# Patient Record
Sex: MALE | Race: WHITE | NOT HISPANIC OR LATINO | Employment: FULL TIME | URBAN - METROPOLITAN AREA
[De-identification: names, ages, dates, MRNs, and addresses within clinical notes are randomized per-mention and may not be internally consistent; named-entity substitution may affect disease eponyms.]

---

## 2018-02-26 ENCOUNTER — THERAPY VISIT (OUTPATIENT)
Dept: PHYSICAL THERAPY | Facility: CLINIC | Age: 28
End: 2018-02-26
Payer: COMMERCIAL

## 2018-02-26 DIAGNOSIS — M79.672 LEFT FOOT PAIN: Primary | ICD-10-CM

## 2018-02-26 PROCEDURE — 97161 PT EVAL LOW COMPLEX 20 MIN: CPT | Mod: GP | Performed by: PHYSICAL THERAPIST

## 2018-02-26 PROCEDURE — 97110 THERAPEUTIC EXERCISES: CPT | Mod: GP | Performed by: PHYSICAL THERAPIST

## 2018-02-26 NOTE — MR AVS SNAPSHOT
After Visit Summary   2/26/2018    Juan Alberto Chery    MRN: [id number]           Patient Information     Date Of Birth          1990        Visit Information        Provider Department      2/26/2018 10:10 AM Meaghan Subramanian, PT [facility] [facility] Physical Therapy        Today's Diagnoses     Left foot pain    -  1       Follow-ups after your visit        Your next 10 appointments already scheduled     Mar 08, 2018 12:00 PM CST   SULAIMAN Extremity with Meaghan Subramanian, PT   [facility] [facility] Physical Therapy ([place] [place]  )    [address]  [facility] [address]   [phone]            Mar 14, 2018  9:30 AM CDT   SULAIMAN Extremity with Meaghan Subramanian PT   [facility] [facility] Physical Therapy ([place] [place]  )    [address]  [facility] [address]   [phone]            Mar 21, 2018 10:10 AM CDT   SULAIMAN Extremity with Meaghan Subramanian PT   [facility] [facility] Physical Therapy ([facility] [place]  )    [address]  [facility] [address]   [phone]            Mar 28, 2018 10:40 AM CDT   SULAIMAN Extremity with Scott Lira Pt, PT   [place] [facility] Physical [facility] ([place] [place]  )    [address]  [facility] [address]   [phone]            Apr 04, 2018 10:10 AM CDT   SULAIMAN Extremity with Meaghan Subramanian PT   [place] [facility] ([place] [place]  )    [address]  [facility] [address]   [phone]              Who to contact     If you have questions or need follow up information about today's clinic visit or your schedule please contact [place] [facility] directly at [phone].  Normal or non-critical lab and imaging results will be communicated to you by MyChart, letter or phone within 4 business days after the clinic has received the results.  "If you do not hear from us within 7 days, please contact the clinic through [employer] or phone. If you have a critical or abnormal lab result, we will notify you by phone as soon as possible.  Submit refill requests through [employer] or call your pharmacy and they will forward the refill request to us. Please allow 3 business days for your refill to be completed.          Additional Information About Your Visit        [employer] Information     [employer] lets you send messages to your doctor, view your test results, renew your prescriptions, schedule appointments and more. To sign up, go to [email or web address] . Click on \"Log in\" on the left side of the screen, which will take you to the Welcome page. Then click on \"Sign up Now\" on the right side of the page.     You will be asked to enter the access code listed below, as well as some personal information. Please follow the directions to create your username and password.     Your access code is: [email or web address]  Expires: 2018 11:57 AM     Your access code will  in 90 days. If you need help or a new code, please call your [place] clinic or [phone].        Care EveryWhere ID     This is your Care EveryWhere ID. This could be used by other organizations to access your [place] medical records  [id number]         Blood Pressure from Last 3 Encounters:   No data found for BP    Weight from Last 3 Encounters:   No data found for Wt              We Performed the Following     HC PT EVAL, LOW COMPLEXITY     [facility] INITIAL EVAL REPORT     THERAPEUTIC EXERCISES        Primary Care Provider Office Phone # Fax #    Hammad Diaz [phone]       [facility] [address] [place] [address]        Equal Access to Services     [facility] : Hadii gabriel Hicks, beni wang, qaybta mary toure. So [facility] [phone].    ATENCIÓN: Si habla español, tiene a levine disposición " servicios gratuitos de asistencia lingüística. [address]riquez [phone].    We comply with applicable federal civil rights laws and [place] laws. We do not discriminate on the basis of race, color, national origin, age, disability, sex, sexual orientation, or gender identity.            Thank you!     Thank you for choosing [place] [facility] PHYSICAL THERAPY  for your care. Our goal is always to provide you with excellent care. Hearing back from our patients is one way we can continue to improve our services. Please take a few minutes to complete the written survey that you may receive in the mail after your visit with us. Thank you!             Your Updated Medication List - Protect others around you: Learn how to safely use, store and throw away your medicines at www.disposemymeds.org.      Notice  As of 2/26/2018 11:57 AM    You have not been prescribed any medications.

## 2018-02-26 NOTE — PROGRESS NOTES
[place] [facility] Initial Evaluation  Subjective:  Patient is a 27 year old male presenting with rehab left ankle/foot hpi.                                    General health as reported by patient is excellent.  Pertinent medical history includes:  Other (numbness/tingling).    Other surgeries include:  Orthopedic surgery (knee).  Current medications:  Anti-inflammatory.  Current occupation is Personal care assistant  .    Primary job tasks include:  Prolonged standing, lifting, driving and other (carrying,pushing,and pulling).    Barriers include:  None as reported by patient.    Red flags:  None as reported by patient.                        Objective:  System    Physical Exam    General     ROS    Assessment/Plan:

## 2018-02-26 NOTE — PROGRESS NOTES
[place] [facility] Initial Evaluation  Subjective:  Patient is a 27 year old male presenting with rehab left ankle/foot hpi. The history is provided by the patient. No  was used.   Juan Alberto Chery is a 27 year old male with a left ankle condition.  Condition occurred with:  A wrong landing.  Condition occurred: during recreation/sport.  This is a new condition  Injured 1/14/2018 during basketball with coming down and having another player land on it.  Wore boot for two week and then had surgery on 2/8/2018.  Had screw placement.  Is NWB for 6 weeks on crutches in a boot.  Lives in an apartment with others.  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  Belongs to [employer].  .    Site of Pain: mid foot.    Pain is described as aching  and reported as 2/10.  Associated symptoms:  Edema. Pain is the same all the time.  Symptoms are exacerbated by activity   Since onset symptoms are gradually improving.  Special tests:  X-ray.                            Red flags:  None as reported by patient.                        Objective:  System    Ankle/Foot Evaluation  ROM:    AROM:    Dorsiflexion:  Left:   8  Right:   20  Plantarflexion:  Left:  30    Right:  38  Inversion:  Left:  Slight limitation, pain lateral ankle     Right:  WNL    Great toe flexion: Left:  Slight limitation     Right:   Great Toe Extension:  Left:  Slight limiation     Right: wnl  PROM:    Dorsiflexion: Left:    10     Right:     Plantarflexion: Left:    Wnl     Right:       Great Toe Flexion: Left:  Wnl     Right:     Great Toe Extension: Left:    Wnl     Right:        LIGAMENT TESTING: not assessed              SPECIAL TESTS: Special tests ankle: sensation over the dorsal left foot, 1st toe is decreased with light touch.    PALPATION: Palpation of ankle: incisions.  Left ankle tenderness present at:  incisional    EDEMA:   Left ankle edema present at: forefoot           MOBILITY TESTING: not assessed              FUNCTIONAL TESTS: not assessed                                                              General     ROS    Assessment/Plan:    Patient is a 27 year old male with foot complaints.    Patient has the following significant findings with corresponding treatment plan.                Diagnosis 1:  Foot pain  Pain -  manual therapy, self management, education and home program  Decreased ROM/flexibility - manual therapy, therapeutic exercise and home program  Decreased joint mobility - manual therapy, therapeutic exercise and home program  Decreased strength - therapeutic exercise, therapeutic activities and home program  Inflammation - cold therapy and self management/home program  Impaired gait - gait training, assistive devices and home program  Decreased function - therapeutic activities and home program    Therapy Evaluation Codes:   1) History comprised of:   Personal factors that impact the plan of care:      None.    Comorbidity factors that impact the plan of care are:      None.     Medications impacting care: None.  2) Examination of Body Systems comprised of:   Body structures and functions that impact the plan of care:      foot.   Activity limitations that impact the plan of care are:      Bathing, Cooking, Jumping, Lifting, Running, Sports, Squatting/kneeling, Stairs, Standing and Walking.  3) Clinical presentation characteristics are:   Stable/Uncomplicated.  4) Decision-Making    Low complexity using standardized patient assessment instrument and/or measureable assessment of functional outcome.  Cumulative Therapy Evaluation is: Low complexity.    Previous and current functional limitations:  (See Goal Flow Sheet for this information)    Short term and Long term goals: (See Goal Flow Sheet for this information)     Communication ability:  Patient appears to be able to clearly communicate and understand verbal and written communication and follow directions  correctly.  Treatment Explanation - The following has been discussed with the patient:   RX ordered/plan of care  Anticipated outcomes  Possible risks and side effects  This patient would benefit from PT intervention to resume normal activities.   Rehab potential is excellent.    Frequency:  1-2 X week, once daily  Duration:  for 6 weeks  Discharge Plan:  Independent in home treatment program.  Reach maximal therapeutic benefit.    Please refer to the daily flowsheet for treatment today, total treatment time and time spent performing 1:1 timed codes.

## 2018-02-26 NOTE — LETTER
[facility] PHYSICAL [facility]  [address]  [facility] [address] [phone]    2018    Re: Juan Alberto Chery   :   1990  MRN:  [id number]   REFERRING PHYSICIAN:   Hammad Diaz    [facility] ATHLETIC [facility] PHYSICAL [facility]    Date of Initial Evaluation:  2018  Visits:  Rxs Used: 1  Reason for Referral:  Left foot pain    EVALUATION SUMMARY    [facility] Initial Evaluation  Subjective:  Patient is a 27 year old male presenting with rehab left ankle/foot hpi. The history is provided by the patient. No  was used.   Juan Alberto Chery is a 27 year old male with a left ankle condition.  Condition occurred with:  A wrong landing.  Condition occurred: during recreation/sport.  This is a new condition  Injured 2018 during basketball with coming down and having another player land on it.  Wore boot for two week and then had surgery on 2018.  Had screw placement.  Is NWB for 6 weeks on crutches in a boot.  Lives in an apartment with others.  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  Belongs to [employer].  .    Site of Pain: mid foot.    Pain is described as aching  and reported as 2/10.  Associated symptoms:  Edema. Pain is the same all the time.  Symptoms are exacerbated by activity   Since onset symptoms are gradually improving.  Special tests:  X-ray.                General health as reported by patient is excellent.  Pertinent medical history includes:  Other (numbness/tingling).    Other surgeries include:  Orthopedic surgery (knee).  Current medications:  Anti-inflammatory.  Current occupation is Personal care assistant  Primary job tasks include:  Prolonged standing, lifting, driving and other (carrying,pushing,and pulling).  Barriers include:  None as reported by patient.  Red flags:  None as reported by  patient.  Objective:  System  Ankle/Foot Evaluation  ROM:    AROM:    Dorsiflexion:  Left:   8  Right:   20  Plantarflexion:  Left:  30    Right:  38  Inversion:  Left:  Slight limitation, pain lateral ankle     Right:  WNL  Great toe flexion: Left:  Slight limitation     Right:   Great Toe Extension:  Left:  Slight limiation     Right: wnl  PROM:    Dorsiflexion: Left:    10     Right:     Plantarflexion: Left:    Wnl     Right:       Great Toe Flexion: Left:  Wnl     Right:     Great Toe Extension: Left:    Wnl     Right:  LIGAMENT TESTING: not assessed  SPECIAL TESTS: Special tests ankle: sensation over the dorsal left foot, 1st toe is decreased with light touch.  PALPATION: Palpation of ankle: incisions.  Left ankle tenderness present at:  incisional  EDEMA:   Left ankle edema present at: forefoot  MOBILITY TESTING: not assessed  FUNCTIONAL TESTS: not assessed    Assessment/Plan:    Patient is a 27 year old male with foot complaints.    Patient has the following significant findings with corresponding treatment plan.                Diagnosis 1:  Foot pain  Pain -  manual therapy, self management, education and home program  Decreased ROM/flexibility - manual therapy, therapeutic exercise and home program  Decreased joint mobility - manual therapy, therapeutic exercise and home program  Decreased strength - therapeutic exercise, therapeutic activities and home program  Inflammation - cold therapy and self management/home program  Impaired gait - gait training, assistive devices and home program  Decreased function - therapeutic activities and home program  Therapy Evaluation Codes:   1) History comprised of:   Personal factors that impact the plan of care:      None.    Comorbidity factors that impact the plan of care are:      None.     Medications impacting care: None.  2) Examination of Body Systems comprised of:   Body structures and functions that impact the plan of care:      foot.   Activity limitations  that impact the plan of care are:      Bathing, Cooking, Jumping, Lifting, Running, Sports, Squatting/kneeling, Stairs, Standing and Walking.  3) Clinical presentation characteristics are:   Stable/Uncomplicated.  4) Decision-Making    Low complexity using standardized patient assessment instrument and/or measureable assessment of functional outcome.  Cumulative Therapy Evaluation is: Low complexity.    Previous and current functional limitations:  (See Goal Flow Sheet for this information)    Short term and Long term goals: (See Goal Flow Sheet for this information)   Communication ability:  Patient appears to be able to clearly communicate and understand verbal and written communication and follow directions correctly.  Treatment Explanation - The following has been discussed with the patient:   RX ordered/plan of care  Anticipated outcomes  Possible risks and side effects  This patient would benefit from PT intervention to resume normal activities.   Rehab potential is excellent.    Frequency:  1-2 X week, once daily  Duration:  for 6 weeks  Discharge Plan:  Independent in home treatment program.  Reach maximal therapeutic benefit.    Thank you for your referral.    INQUIRIES  Therapist: Meaghan Subramanian, PT, ATCR, [facility]  [facility] PH[facility]ICAL THERAP[facility]  [address] [place] [address] [phone]  [facility] [address]  Phone: [phone]  Fax: [phone]

## 2018-03-08 ENCOUNTER — THERAPY VISIT (OUTPATIENT)
Dept: PHYSICAL THERAPY | Facility: CLINIC | Age: 28
End: 2018-03-08
Payer: COMMERCIAL

## 2018-03-08 DIAGNOSIS — R60.0 LOCALIZED EDEMA: Primary | ICD-10-CM

## 2018-03-08 DIAGNOSIS — M79.672 LEFT FOOT PAIN: ICD-10-CM

## 2018-03-08 PROCEDURE — 97110 THERAPEUTIC EXERCISES: CPT | Mod: GP | Performed by: PHYSICAL THERAPIST

## 2018-03-08 PROCEDURE — 97016 VASOPNEUMATIC DEVICE THERAPY: CPT | Mod: GP | Performed by: PHYSICAL THERAPIST

## 2018-03-08 PROCEDURE — 97140 MANUAL THERAPY 1/> REGIONS: CPT | Mod: GP | Performed by: PHYSICAL THERAPIST

## 2018-03-14 ENCOUNTER — THERAPY VISIT (OUTPATIENT)
Dept: PHYSICAL THERAPY | Facility: CLINIC | Age: 28
End: 2018-03-14
Payer: COMMERCIAL

## 2018-03-14 DIAGNOSIS — R60.0 LOCALIZED EDEMA: ICD-10-CM

## 2018-03-14 DIAGNOSIS — M79.672 LEFT FOOT PAIN: ICD-10-CM

## 2018-03-14 PROCEDURE — 97140 MANUAL THERAPY 1/> REGIONS: CPT | Mod: GP | Performed by: PHYSICAL THERAPIST

## 2018-03-14 PROCEDURE — 97110 THERAPEUTIC EXERCISES: CPT | Mod: GP | Performed by: PHYSICAL THERAPIST

## 2018-03-21 ENCOUNTER — THERAPY VISIT (OUTPATIENT)
Dept: PHYSICAL THERAPY | Facility: CLINIC | Age: 28
End: 2018-03-21
Payer: COMMERCIAL

## 2018-03-21 DIAGNOSIS — M79.672 LEFT FOOT PAIN: ICD-10-CM

## 2018-03-21 DIAGNOSIS — R60.0 LOCALIZED EDEMA: ICD-10-CM

## 2018-03-21 PROCEDURE — 97110 THERAPEUTIC EXERCISES: CPT | Mod: GP | Performed by: PHYSICAL THERAPIST

## 2018-03-21 PROCEDURE — 97140 MANUAL THERAPY 1/> REGIONS: CPT | Mod: GP | Performed by: PHYSICAL THERAPIST

## 2018-03-21 NOTE — PROGRESS NOTES
Subjective:  HPI                    Objective:  System    Physical Exam    General   2/26/2018  ROS    Assessment/Plan:    PROGRESS  REPORT    Progress reporting period is from 2/26/2018 to 3/21/2018.       SUBJECTIVE  Subjective changes noted by patient:  .  Subjective: Feeling starting to come back on the dorsal foot.  Mild discomfort in the foot.      Current pain level is 0/10  .     Previous pain level was   Initial Pain level: 2/10.   Changes in function:  Yes (See Goal flowsheet attached for changes in current functional level)  Adverse reaction to treatment or activity: None    OBJECTIVE  Changes noted in objective findings:  Yes,   Objective: AROM: 15-38, normal inversion and eversion.  Minimal swelling and good scar mobility.       ASSESSMENT/PLAN  Updated problem list and treatment plan: Diagnosis 1:  Foot pain    Pain -  hot/cold therapy, manual therapy, self management, education and home program  Decreased ROM/flexibility - manual therapy, therapeutic exercise and home program  Decreased strength - therapeutic exercise, therapeutic activities and home program  Inflammation - self management/home program  Decreased function - therapeutic activities and home program  STG/LTGs have been met or progress has been made towards goals:  Yes (See Goal flow sheet completed today.)  Assessment of Progress: The patient's condition is improving.  The patient's condition has potential to improve.  Self Management Plans:  Patient has been instructed in a home treatment program.  I have re-evaluated this patient and find that the nature, scope, duration and intensity of the therapy is appropriate for the medical condition of the patient.  Juan Alberto continues to require the following intervention to meet STG and LTG's:  PT    Recommendations:  This patient would benefit from continued therapy.     Frequency:  2 X week, once daily  Duration:  for 3 weeks        Please refer to the daily flowsheet for treatment today,  total treatment time and time spent performing 1:1 timed codes.

## 2018-03-21 NOTE — MR AVS SNAPSHOT
"              After Visit Summary   3/21/2018    Juan Alberto Chery    MRN: [id number]           Patient Information     Date Of Birth          1990        Visit Information        Provider Department      3/21/2018 10:10 AM Meaghan Subramanian, PT [facility] Physical Therapy        Today's Diagnoses     Localized edema        Left foot pain           Follow-ups after your visit        Your next 10 appointments already scheduled     Mar 28, 2018 10:40 AM CDT   SULAIMAN Extremity with Scott Lira Pt, PT   [facility] Physical Therapy ([facility]  )    [address]  [facility] [address]   [phone]            Apr 04, 2018 10:10 AM CDT   SULAIMAN Extremity with Meaghan Subramanian, PT   [facility] Physical Therapy ([facility]  )    [address]  [facility] [id number] [phone]              Who to contact     If you have questions or need follow up information about today's clinic visit or your schedule please contact [facility]IC [facility] PHYSICAL THERAPY directly at [phone].  Normal or non-critical lab and imaging results will be communicated to you by MyChart, letter or phone within 4 business days after the clinic has received the results. If you do not hear from us within 7 days, please contact the clinic through [employer] or phone. If you have a critical or abnormal lab result, we will notify you by phone as soon as possible.  Submit refill requests through [employer] or call your pharmacy and they will forward the refill request to us. Please allow 3 business days for your refill to be completed.          Additional Information About Your Visit        [employer] Information     [employer] lets you send messages to your doctor, view your test results, renew your prescriptions, schedule appointments and more. To sign up, go to [email or web address] . Click on \"Log in\" on the left " "side of the screen, which will take you to the Welcome page. Then click on \"Sign up Now\" on the right side of the page.     You will be asked to enter the access code listed below, as well as some personal information. Please follow the directions to create your username and password.     Your access code is: [email or web address]  Expires: 2018 12:57 PM     Your access code will  in 90 days. If you need help or a new code, please call your [place] clinic or [phone].        Care EveryWhere ID     This is your Care EveryWhere ID. This could be used by other organizations to access your [place] medical records  [id number]         Blood Pressure from Last 3 Encounters:   No data found for BP    Weight from Last 3 Encounters:   No data found for Wt              We Performed the Following     [facility] PROGRESS NOTES REPORT     MANUAL THER TECH,1+REGIONS,EA 15 MIN     THERAPEUTIC EXERCISES        Primary Care Provider Office Phone # Fax #    Hammad Diaz [phone]       [facility] [address] [place] [address]        Equal Access to Services     [facility]: Hadii aad ku hadasho Sodena, waaxda luqadaha, qaybta kaalmada adexiao, mary parker . So [facility] [phone].    ATENCIÓN: Si habla español, tiene a levine disposición servicios gratuitos de asistencia lingüística. [facility] [phone].    We comply with applicable federal civil rights laws and [place] laws. We do not discriminate on the basis of race, color, national origin, age, disability, sex, sexual orientation, or gender identity.            Thank you!     Thank you for choosing [facility] PHYSICAL THERAPY  for your care. Our goal is always to provide you with excellent care. Hearing back from our patients is one way we can continue to improve our services. Please take a few minutes to complete the written survey that you may receive in the mail after " your visit with us. Thank you!             Your Updated Medication List - Protect others around you: Learn how to safely use, store and throw away your medicines at www.disposemymeds.org.      Notice  As of 3/21/2018 10:56 AM    You have not been prescribed any medications.

## 2018-03-28 ENCOUNTER — THERAPY VISIT (OUTPATIENT)
Dept: PHYSICAL THERAPY | Facility: CLINIC | Age: 28
End: 2018-03-28
Payer: COMMERCIAL

## 2018-03-28 DIAGNOSIS — R60.0 LOCALIZED EDEMA: ICD-10-CM

## 2018-03-28 DIAGNOSIS — M79.672 LEFT FOOT PAIN: ICD-10-CM

## 2018-03-28 PROCEDURE — 97110 THERAPEUTIC EXERCISES: CPT | Mod: GP | Performed by: PHYSICAL THERAPIST

## 2018-03-28 PROCEDURE — 97140 MANUAL THERAPY 1/> REGIONS: CPT | Mod: GP | Performed by: PHYSICAL THERAPIST

## 2018-04-04 ENCOUNTER — THERAPY VISIT (OUTPATIENT)
Dept: PHYSICAL THERAPY | Facility: CLINIC | Age: 28
End: 2018-04-04
Payer: COMMERCIAL

## 2018-04-04 DIAGNOSIS — M79.672 LEFT FOOT PAIN: ICD-10-CM

## 2018-04-04 DIAGNOSIS — R60.0 LOCALIZED EDEMA: ICD-10-CM

## 2018-04-04 PROCEDURE — 97110 THERAPEUTIC EXERCISES: CPT | Mod: GP | Performed by: PHYSICAL THERAPIST

## 2018-04-04 PROCEDURE — 97140 MANUAL THERAPY 1/> REGIONS: CPT | Mod: GP | Performed by: PHYSICAL THERAPIST

## 2018-04-19 ENCOUNTER — THERAPY VISIT (OUTPATIENT)
Dept: PHYSICAL THERAPY | Facility: CLINIC | Age: 28
End: 2018-04-19
Payer: COMMERCIAL

## 2018-04-19 DIAGNOSIS — R60.0 LOCALIZED EDEMA: ICD-10-CM

## 2018-04-19 DIAGNOSIS — M79.672 LEFT FOOT PAIN: ICD-10-CM

## 2018-04-19 PROCEDURE — 97110 THERAPEUTIC EXERCISES: CPT | Mod: GP | Performed by: PHYSICAL THERAPIST

## 2018-04-19 PROCEDURE — 97140 MANUAL THERAPY 1/> REGIONS: CPT | Mod: GP | Performed by: PHYSICAL THERAPIST

## 2018-04-26 ENCOUNTER — THERAPY VISIT (OUTPATIENT)
Dept: PHYSICAL THERAPY | Facility: CLINIC | Age: 28
End: 2018-04-26
Payer: COMMERCIAL

## 2018-04-26 DIAGNOSIS — R60.0 LOCALIZED EDEMA: ICD-10-CM

## 2018-04-26 DIAGNOSIS — M79.672 LEFT FOOT PAIN: ICD-10-CM

## 2018-04-26 PROCEDURE — 97110 THERAPEUTIC EXERCISES: CPT | Mod: GP | Performed by: PHYSICAL THERAPIST

## 2018-04-26 PROCEDURE — 97112 NEUROMUSCULAR REEDUCATION: CPT | Mod: GP | Performed by: PHYSICAL THERAPIST

## 2018-05-03 ENCOUNTER — THERAPY VISIT (OUTPATIENT)
Dept: PHYSICAL THERAPY | Facility: CLINIC | Age: 28
End: 2018-05-03
Payer: COMMERCIAL

## 2018-05-03 DIAGNOSIS — M79.672 LEFT FOOT PAIN: ICD-10-CM

## 2018-05-03 DIAGNOSIS — R60.0 LOCALIZED EDEMA: ICD-10-CM

## 2018-05-03 PROCEDURE — 97112 NEUROMUSCULAR REEDUCATION: CPT | Mod: GP | Performed by: PHYSICAL THERAPIST

## 2018-05-03 PROCEDURE — 97110 THERAPEUTIC EXERCISES: CPT | Mod: GP | Performed by: PHYSICAL THERAPIST

## 2018-05-24 ENCOUNTER — THERAPY VISIT (OUTPATIENT)
Dept: PHYSICAL THERAPY | Facility: CLINIC | Age: 28
End: 2018-05-24
Payer: COMMERCIAL

## 2018-05-24 DIAGNOSIS — M79.672 LEFT FOOT PAIN: ICD-10-CM

## 2018-05-24 DIAGNOSIS — R60.0 LOCALIZED EDEMA: ICD-10-CM

## 2018-05-24 PROCEDURE — 97110 THERAPEUTIC EXERCISES: CPT | Mod: GP | Performed by: PHYSICAL THERAPIST

## 2018-05-24 NOTE — MR AVS SNAPSHOT
"              After Visit Summary   2018    Juan Alberto Chery    MRN: [id number]           Patient Information     Date Of Birth          1990        Visit Information        Provider Department      2018 3:10 PM Meaghan Subramanian PT [facility] Physical Therapy        Today's Diagnoses     Localized edema        Left foot pain           Follow-ups after your visit        Who to contact     If you have questions or need follow up information about today's clinic visit or your schedule please contact [facility] ATHLETIC [facility] PHYSICAL [facility] directly at [phone].  Normal or non-critical lab and imaging results will be communicated to you by [employer], letter or phone within 4 business days after the clinic has received the results. If you do not hear from us within 7 days, please contact the clinic through [employer] or phone. If you have a critical or abnormal lab result, we will notify you by phone as soon as possible.  Submit refill requests through [employer] or call your pharmacy and they will forward the refill request to us. Please allow 3 business days for your refill to be completed.          Additional Information About Your Visit        MyChart Information     [employer] lets you send messages to your doctor, view your test results, renew your prescriptions, schedule appointments and more. To sign up, go to [email or web address] . Click on \"Log in\" on the left side of the screen, which will take you to the Welcome page. Then click on \"Sign up Now\" on the right side of the page.     You will be asked to enter the access code listed below, as well as some personal information. Please follow the directions to create your username and password.     Your access code is: [email or web address]  Expires: 2018  3:06 PM     Your access code will  in 90 days. If you need help or a new code, please call your [place] clinic or [phone].        Care " EveryWhere ID     This is your Care EveryWhere ID. This could be used by other organizations to access your [place] medical records  [id number]         Blood Pressure from Last 3 Encounters:   No data found for BP    Weight from Last 3 Encounters:   No data found for Wt              We Performed the Following     SULAIMAN PROGRESS NOTES REPORT     THERAPEUTIC EXERCISES        Primary Care Provider Office Phone # Fax #    Hammad Diaz [phone]       [facility] [address] [place] [address]        Equal Access to Services     [facility] : Hadii aad ku hadasho Soomaali, waaxda luqadaha, qaybta kaalmada adeegyada, waxay idiin hayaan adeeg kharash lamarivel . So [facility] [phone].    ATENCIÓN: Si habla español, tiene a levine disposición servicios gratuitos de asistencia lingüística. [facility] [phone].    We comply with applicable federal civil rights laws and [place] laws. We do not discriminate on the basis of race, color, national origin, age, disability, sex, sexual orientation, or gender identity.            Thank you!     Thank you for choosing [facility]  for your care. Our goal is always to provide you with excellent care. Hearing back from our patients is one way we can continue to improve our services. Please take a few minutes to complete the written survey that you may receive in the mail after your visit with us. Thank you!             Your Updated Medication List - Protect others around you: Learn how to safely use, store and throw away your medicines at www.disposemymeds.org.      Notice  As of 5/24/2018  3:40 PM    You have not been prescribed any medications.

## 2018-05-24 NOTE — PROGRESS NOTES
Subjective:  HPI                    Objective:  System    Physical Exam    General     ROS    Assessment/Plan:    DISCHARGE REPORT    Progress reporting period is from 3/21/18 to 5/24/18.       SUBJECTIVE  Subjective changes noted by patient: .  Subjective: Trip went very well, went hiking [place] and foot didn't act up, just sore on the plantar surface and anterior capsule. Was able to complete everything he wanted to.     Current pain level is 0/10  .     Previous pain level was  2/10 Initial Pain level: 2/10.   Changes in function:  Yes (See Goal flowsheet attached for changes in current functional level)  Adverse reaction to treatment or activity: None    OBJECTIVE  Changes noted in objective findings:  Yes,   Objective: Pain is gone except for slight anterior tib tendon pain and irritation. Slight edema noted. Educated on calf raises concentric and return to run program. Assessed balance and pt has progressed to PLF.       ASSESSMENT/PLAN  STG/LTGs have been met or progress has been made towards goals:  Yes (See Goal flow sheet completed today.)  Assessment of Progress: The patient has met all of their long term goals.  Self Management Plans:  Patient is independent in a home treatment program.    Recommendations:  This patient is ready to be discharged from therapy and continue their home treatment program.    Please refer to the daily flowsheet for treatment today, total treatment time and time spent performing 1:1 timed codes.

## 2020-08-17 DIAGNOSIS — I86.1 VARICOCELE: Primary | ICD-10-CM

## 2020-08-26 DIAGNOSIS — I86.1 VARICOCELE: ICD-10-CM

## 2020-08-26 LAB
ABNORMAL SPERM: 95 MORPHOLOGY
ABSTINENCE DAYS: 3 DAYS (ref 2–7)
AGGLUTINATION: NO YES/NO
ANALYSIS TEMP - CENTIGRADE: 25 CENTIGRADE
CELL FRAGMENTS: NORMAL %
COLLECTION METHOD: NORMAL
COLLECTION SITE: NORMAL
CONSENT TO RELEASE TO PARTNER: NO
HEAD DEFECT: 95
IMMATURE SPERM: NORMAL %
IMMOTILE: 25 %
LAB RECEIPT TIME: NORMAL
LIQUEFIED: YES YES/NO
MIDPIECE DEFECT: 49
NON-PROGRESSIVE MOTILITY: 2 %
NORMAL SPERM: 5 % NORMAL FORMS (ref 4–?)
PROGRESSIVE MOTILITY: 73 % (ref 32–?)
ROUND CELLS: <0.1 MILLION/ML (ref ?–2)
SPECIMEN CONCENTRATION: 45 MILLION/ML (ref 15–?)
SPECIMEN PH: 7.6 PH (ref 7.2–?)
SPECIMEN TYPE: NORMAL
SPECIMEN VOL UR: 3.8 ML (ref 1.5–?)
TAIL DEFECT: 12
TIME OF ANALYSIS: NORMAL
TOTAL NUMBER: 171 MILLION (ref 39–?)
TOTAL PROGRESSIVE MOTILE: 125 MILLION (ref 15.6–?)
VISCOUS: NO YES/NO
VITALITY: NORMAL % (ref 58–?)
WBC SPECIMEN: NORMAL %

## 2020-08-26 PROCEDURE — 89322 SEMEN ANAL STRICT CRITERIA: CPT

## 2021-10-28 ENCOUNTER — LAB REQUISITION (OUTPATIENT)
Dept: LAB | Facility: CLINIC | Age: 31
End: 2021-10-28

## 2021-10-28 PROCEDURE — 86481 TB AG RESPONSE T-CELL SUSP: CPT | Performed by: INTERNAL MEDICINE

## 2021-10-28 PROCEDURE — 86706 HEP B SURFACE ANTIBODY: CPT | Performed by: INTERNAL MEDICINE

## 2021-10-29 LAB — HBV SURFACE AB SERPL IA-ACNC: 13.87 M[IU]/ML

## 2021-10-30 LAB
QUANTIFERON MITOGEN: 10 IU/ML
QUANTIFERON NIL TUBE: 0.03 IU/ML
QUANTIFERON TB1 TUBE: 0.03 IU/ML
QUANTIFERON TB2 TUBE: 0.03

## 2021-10-31 LAB
GAMMA INTERFERON BACKGROUND BLD IA-ACNC: 0.03 IU/ML
M TB IFN-G BLD-IMP: NEGATIVE
M TB IFN-G CD4+ BCKGRND COR BLD-ACNC: 9.97 IU/ML
MITOGEN IGNF BCKGRD COR BLD-ACNC: 0 IU/ML
MITOGEN IGNF BCKGRD COR BLD-ACNC: 0 IU/ML

## 2022-09-13 ENCOUNTER — TELEPHONE (OUTPATIENT)
Dept: ORTHOPEDICS | Facility: CLINIC | Age: 32
End: 2022-09-13

## 2022-09-13 ENCOUNTER — TELEPHONE (OUTPATIENT)
Dept: FAMILY MEDICINE | Facility: CLINIC | Age: 32
End: 2022-09-13

## 2022-09-13 ENCOUNTER — OFFICE VISIT (OUTPATIENT)
Dept: FAMILY MEDICINE | Facility: CLINIC | Age: 32
End: 2022-09-13
Payer: COMMERCIAL

## 2022-09-13 VITALS
OXYGEN SATURATION: 96 % | TEMPERATURE: 97.9 F | WEIGHT: 189.5 LBS | RESPIRATION RATE: 16 BRPM | DIASTOLIC BLOOD PRESSURE: 78 MMHG | HEART RATE: 57 BPM | SYSTOLIC BLOOD PRESSURE: 125 MMHG

## 2022-09-13 DIAGNOSIS — R07.9 CHEST PAIN, UNSPECIFIED TYPE: Primary | ICD-10-CM

## 2022-09-13 LAB
ATRIAL RATE - MUSE: 61 BPM
BASOPHILS # BLD AUTO: 0.1 10E3/UL (ref 0–0.2)
BASOPHILS NFR BLD AUTO: 1 %
D DIMER PPP FEU-MCNC: <=0.27 UG/ML FEU (ref 0–0.5)
DIASTOLIC BLOOD PRESSURE - MUSE: NORMAL MMHG
EOSINOPHIL # BLD AUTO: 0 10E3/UL (ref 0–0.7)
EOSINOPHIL NFR BLD AUTO: 1 %
ERYTHROCYTE [DISTWIDTH] IN BLOOD BY AUTOMATED COUNT: 11.7 % (ref 10–15)
HCT VFR BLD AUTO: 45.5 % (ref 40–53)
HGB BLD-MCNC: 15.6 G/DL (ref 13.3–17.7)
IMM GRANULOCYTES # BLD: 0 10E3/UL
IMM GRANULOCYTES NFR BLD: 0 %
INTERPRETATION ECG - MUSE: NORMAL
LYMPHOCYTES # BLD AUTO: 1.4 10E3/UL (ref 0.8–5.3)
LYMPHOCYTES NFR BLD AUTO: 25 %
MCH RBC QN AUTO: 31.9 PG (ref 26.5–33)
MCHC RBC AUTO-ENTMCNC: 34.3 G/DL (ref 31.5–36.5)
MCV RBC AUTO: 93 FL (ref 78–100)
MONOCYTES # BLD AUTO: 0.5 10E3/UL (ref 0–1.3)
MONOCYTES NFR BLD AUTO: 9 %
NEUTROPHILS # BLD AUTO: 3.6 10E3/UL (ref 1.6–8.3)
NEUTROPHILS NFR BLD AUTO: 65 %
P AXIS - MUSE: 81 DEGREES
PLATELET # BLD AUTO: 185 10E3/UL (ref 150–450)
PR INTERVAL - MUSE: 176 MS
QRS DURATION - MUSE: 100 MS
QT - MUSE: 446 MS
QTC - MUSE: 448 MS
R AXIS - MUSE: 88 DEGREES
RBC # BLD AUTO: 4.89 10E6/UL (ref 4.4–5.9)
SYSTOLIC BLOOD PRESSURE - MUSE: NORMAL MMHG
T AXIS - MUSE: 36 DEGREES
VENTRICULAR RATE- MUSE: 61 BPM
WBC # BLD AUTO: 5.5 10E3/UL (ref 4–11)

## 2022-09-13 PROCEDURE — 85025 COMPLETE CBC W/AUTO DIFF WBC: CPT | Performed by: FAMILY MEDICINE

## 2022-09-13 PROCEDURE — 36415 COLL VENOUS BLD VENIPUNCTURE: CPT | Performed by: FAMILY MEDICINE

## 2022-09-13 PROCEDURE — 93010 ELECTROCARDIOGRAM REPORT: CPT | Performed by: INTERNAL MEDICINE

## 2022-09-13 PROCEDURE — 99204 OFFICE O/P NEW MOD 45 MIN: CPT | Performed by: FAMILY MEDICINE

## 2022-09-13 PROCEDURE — 85379 FIBRIN DEGRADATION QUANT: CPT | Performed by: FAMILY MEDICINE

## 2022-09-13 PROCEDURE — 93005 ELECTROCARDIOGRAM TRACING: CPT | Performed by: FAMILY MEDICINE

## 2022-09-13 NOTE — PROGRESS NOTES
Assessment:       Chest pain, unspecified type  - EKG 12-lead, tracing only  - XR Chest 2 Views  - D dimer quantitative  - CBC with platelets differential  - D dimer quantitative  - CBC with platelets differential         Plan:     Patient with chest pain, likely costochondritis.  His CBC unremarkable.  D-dimer ordered and is unremarkable.  EKG ordered and reviewed by myself showing a normal sinus rhythm without acute ST or T wave changes chest x-ray ordered and reviewed by myself also shows no acute changes, pneumothorax, infiltrate, or other significant abnormality.  Recommend NSAIDs taken regularly with food over the next week or so.  Recommend follow-up in ED if developing worsening chest pain, shortness of breath, pain with exertion, or any other concerning symptoms.  Patient and his wife are agreeable with this plan.        Subjective:       31 year old male presents for evaluation of sudden onset of acute anterior chest wall pain that started about 30 minutes ago and is worse with movement.  3 days ago he got a COVID-19 vaccination (his fourth) and never had an issue with this prior.  He did a workout in the gym yesterday which went fine.  The pain is better if he sits still.  Does not seem to be bothered by exertion but does with any type of turning of his chest wall as well as pushing on his chest wall.  No trauma to his chest.  He has not had any fevers, nasal congestion, cough, and does not have any shortness of breath.    Patient Active Problem List   Diagnosis   (none) - all problems resolved or deleted       No past medical history on file.    No past surgical history on file.    No current outpatient medications on file.     No current facility-administered medications for this visit.       No Known Allergies    No family history on file.    Social History     Socioeconomic History     Marital status: Single     Spouse name: None     Number of children: None     Years of education: None     Highest  education level: None   Tobacco Use     Smoking status: Never Smoker     Smokeless tobacco: Never Used         Review of Systems  Pertinent items are noted in HPI.      Objective:     BP (!) 148/79   Pulse 60   Temp 97.9  F (36.6  C) (Oral)   Resp 16   Wt 86 kg (189 lb 8 oz)   SpO2 96%      General appearance: alert, appears stated age and cooperative  Neck: no adenopathy  Lungs: clear to auscultation bilaterally  Chest wall: right sided chest wall tenderness, most notably at the rib/sternal border  Heart: Regular rate and rhythm, no murmurs  Extremities: Warm and well perfused.          This note has been dictated using voice recognition software. Any grammatical or context distortions are unintentional and inherent to the software

## 2022-09-13 NOTE — PATIENT INSTRUCTIONS
Your EKG is normal.  Your chest x-ray is also completely normal without abnormalities.    I suspect that this is likely costochondritis which is inflammation of the cartilage in your chest wall especially where your ribs meet your sternum.  Treatment for this includes nonsteroidal anti-inflammatory medications such as ibuprofen or Aleve.  I would recommend taking ibuprofen 600 mg every 6-8 hours with some food over the next several days.  Please follow-up if your symptoms are getting worse or not improving or if you develop any other concerning symptoms such as shortness of breath, worsening pain, pain with exertion, lightheadedness, dizziness.

## 2022-09-13 NOTE — LETTER
September 13, 2022      Juan Alberto Chery  [address] [address]        To Whom It May Concern:    Juan Alberto Chery was seen in our clinic. He may return to work with the following: no lifting greater than 25 lb until 9/20/2022.      Sincerely,        Pati Dow MD

## 2022-09-13 NOTE — TELEPHONE ENCOUNTER
Left message to call back regarding lab results.  When patient calls back please let him know that D-dimer and complete blood count were entirely normal.  No further follow-up needed unless symptoms get worse or are not improving as expected.    Pati Dow M.D. 9/13/2022 1:24 PM

## 2022-09-13 NOTE — TELEPHONE ENCOUNTER
No concerns on EKG.  It is essentially normal.  Labs are all normal.  No further follow-up needed unless symptoms are not improved in 1 week or getting worse in any way as discussed.  Please notify patient.    Pati Dow M.D. 9/13/2022 2:16 PM

## 2022-09-13 NOTE — TELEPHONE ENCOUNTER
"Patient called back.  Patient is\" wondering if Dr. Dow can address the EKG epsilon wave B1 and is there any follow-up?\"  He also spoke about lab results?    Please Advise patient.  Thank you.    Mio Mosher,     "

## 2022-09-15 NOTE — TELEPHONE ENCOUNTER
Call and left message for patient to return call for provider message. Callback number provided.    Mary Kay Bradford on 9/15/2022 at 12:36 PM

## 2022-09-15 NOTE — TELEPHONE ENCOUNTER
Patient Returning Call    Reason for call:  Returning call /missed call    Information relayed to patient:  Relayed Dr. Dow's 9/13/2022 message.     Patient has additional questions:  No

## 2024-06-24 ENCOUNTER — HOSPITAL ENCOUNTER (EMERGENCY)
Facility: CLINIC | Age: 34
Discharge: HOME | End: 2024-06-24
Attending: EMERGENCY MEDICINE | Admitting: EMERGENCY MEDICINE
Payer: COMMERCIAL

## 2024-06-24 VITALS
HEART RATE: 62 BPM | WEIGHT: 198.19 LBS | TEMPERATURE: 97.4 F | OXYGEN SATURATION: 99 % | BODY MASS INDEX: 24.64 KG/M2 | RESPIRATION RATE: 16 BRPM | DIASTOLIC BLOOD PRESSURE: 98 MMHG | SYSTOLIC BLOOD PRESSURE: 153 MMHG | HEIGHT: 75 IN

## 2024-06-24 DIAGNOSIS — R00.2 PALPITATIONS: ICD-10-CM

## 2024-06-24 LAB
ANION GAP SERPL CALCULATED.3IONS-SCNC: 12 MMOL/L (ref 7–15)
ATRIAL RATE - MUSE: 68 BPM
BASOPHILS # BLD AUTO: 0 10E3/UL (ref 0–0.2)
BASOPHILS NFR BLD AUTO: 1 %
BUN SERPL-MCNC: 12.3 MG/DL (ref 6–20)
CALCIUM SERPL-MCNC: 9.2 MG/DL (ref 8.6–10)
CHLORIDE SERPL-SCNC: 106 MMOL/L (ref 98–107)
CREAT SERPL-MCNC: 0.98 MG/DL (ref 0.67–1.17)
DEPRECATED HCO3 PLAS-SCNC: 25 MMOL/L (ref 22–29)
DIASTOLIC BLOOD PRESSURE - MUSE: NORMAL MMHG
EGFRCR SERPLBLD CKD-EPI 2021: >90 ML/MIN/1.73M2
EOSINOPHIL # BLD AUTO: 0.1 10E3/UL (ref 0–0.7)
EOSINOPHIL NFR BLD AUTO: 2 %
ERYTHROCYTE [DISTWIDTH] IN BLOOD BY AUTOMATED COUNT: 11.9 % (ref 10–15)
GLUCOSE SERPL-MCNC: 98 MG/DL (ref 70–99)
HCT VFR BLD AUTO: 43.7 % (ref 40–53)
HGB BLD-MCNC: 15.3 G/DL (ref 13.3–17.7)
HOLD SPECIMEN: NORMAL
HOLD SPECIMEN: NORMAL
IMM GRANULOCYTES # BLD: 0 10E3/UL
IMM GRANULOCYTES NFR BLD: 0 %
INTERPRETATION ECG - MUSE: NORMAL
LYMPHOCYTES # BLD AUTO: 1.8 10E3/UL (ref 0.8–5.3)
LYMPHOCYTES NFR BLD AUTO: 45 %
MAGNESIUM SERPL-MCNC: 2.1 MG/DL (ref 1.7–2.3)
MCH RBC QN AUTO: 33 PG (ref 26.5–33)
MCHC RBC AUTO-ENTMCNC: 35 G/DL (ref 31.5–36.5)
MCV RBC AUTO: 94 FL (ref 78–100)
MONOCYTES # BLD AUTO: 0.3 10E3/UL (ref 0–1.3)
MONOCYTES NFR BLD AUTO: 8 %
NEUTROPHILS # BLD AUTO: 1.8 10E3/UL (ref 1.6–8.3)
NEUTROPHILS NFR BLD AUTO: 44 %
NRBC # BLD AUTO: 0 10E3/UL
NRBC BLD AUTO-RTO: 0 /100
P AXIS - MUSE: 80 DEGREES
PLATELET # BLD AUTO: 183 10E3/UL (ref 150–450)
POTASSIUM SERPL-SCNC: 4.6 MMOL/L (ref 3.4–5.3)
PR INTERVAL - MUSE: 156 MS
QRS DURATION - MUSE: 106 MS
QT - MUSE: 428 MS
QTC - MUSE: 455 MS
R AXIS - MUSE: 89 DEGREES
RBC # BLD AUTO: 4.63 10E6/UL (ref 4.4–5.9)
SODIUM SERPL-SCNC: 143 MMOL/L (ref 135–145)
SYSTOLIC BLOOD PRESSURE - MUSE: NORMAL MMHG
T AXIS - MUSE: 39 DEGREES
VENTRICULAR RATE- MUSE: 68 BPM
WBC # BLD AUTO: 4 10E3/UL (ref 4–11)

## 2024-06-24 PROCEDURE — 80048 BASIC METABOLIC PNL TOTAL CA: CPT | Performed by: EMERGENCY MEDICINE

## 2024-06-24 PROCEDURE — 99284 EMERGENCY DEPT VISIT MOD MDM: CPT

## 2024-06-24 PROCEDURE — 83735 ASSAY OF MAGNESIUM: CPT | Performed by: EMERGENCY MEDICINE

## 2024-06-24 PROCEDURE — 85004 AUTOMATED DIFF WBC COUNT: CPT | Performed by: EMERGENCY MEDICINE

## 2024-06-24 PROCEDURE — 36415 COLL VENOUS BLD VENIPUNCTURE: CPT | Performed by: EMERGENCY MEDICINE

## 2024-06-24 PROCEDURE — 93005 ELECTROCARDIOGRAM TRACING: CPT

## 2024-06-24 PROCEDURE — 85025 COMPLETE CBC W/AUTO DIFF WBC: CPT | Performed by: EMERGENCY MEDICINE

## 2024-06-24 ASSESSMENT — COLUMBIA-SUICIDE SEVERITY RATING SCALE - C-SSRS
2. HAVE YOU ACTUALLY HAD ANY THOUGHTS OF KILLING YOURSELF IN THE PAST MONTH?: NO
1. IN THE PAST MONTH, HAVE YOU WISHED YOU WERE DEAD OR WISHED YOU COULD GO TO SLEEP AND NOT WAKE UP?: NO
6. HAVE YOU EVER DONE ANYTHING, STARTED TO DO ANYTHING, OR PREPARED TO DO ANYTHING TO END YOUR LIFE?: NO

## 2024-06-24 ASSESSMENT — ACTIVITIES OF DAILY LIVING (ADL): ADLS_ACUITY_SCORE: 33

## 2024-06-24 NOTE — DISCHARGE INSTRUCTIONS
What do you do next:   You should attach your heart monitor today as you had previously been instructed to do.  Continue your home medications unless we have specifically changed them  If medications were prescribed today, take these as directed.  You can use over-the-counter acetaminophen (Tylenol ) and ibuprofen for fever or pain control as applicable to your visit today.  Acetaminophen (Tylenol): Take 500 to 1000 mg by mouth every 6 hours as needed for fever or pain.  Do not take more than 4000 total milligrams of acetaminophen-containing products in a 24-hour timeframe.  Ibuprofen: Take 600 milligrams by mouth every 6-8 hours as needed for fever or pain.  Take this with food or milk to avoid stomach upset.  Follow up as indicated below    When do you return: If you have persistent palpitations, if there is severe chest pain or shortness of breath, if there is lightheadedness or fainting, or any other symptoms that concern you, please return for reevaluation    Thank you for allowing us to care for you today.

## 2024-06-24 NOTE — ED TRIAGE NOTES
Palpitations for the past 3-4 weeks, but none in the past few days. Went to clinic today, EKG completed and patient sent to ER. In triage, patient denies palpitations, shortness of breath or chest pain.

## 2024-06-24 NOTE — ED PROVIDER NOTES
"  Emergency Department Note      History of Present Illness     Chief Complaint:  Palpitations       HPI   Juan Alberto Chery is a 33 year old male who presents with heart palpitations. Patient notes that he had a EKG done at Urgent Care today that indicated a right bundle branch block. He notes the onset of his heart palpitations for the past three/four weeks but notes that he hasn't had any palpitations for a week. He states that he was advised to use a heart monitor to capture a few days of date. Patient denies a familial history of cardiovascular issues. He denies any associated symptoms.     Independent Historian:    None    Review of External Notes   Clinic note: palps for 3-4 weeks. Feels better last few days. Due to EKG at , recommended to go to ED.    Past Medical History   Medical History, Surgical History, Problem List, and Medications  Reviewed in Epic    Physical Exam   Patient Vitals for the past 24 hrs:   BP Temp Temp src Pulse Resp SpO2 Height Weight   06/24/24 1425 (!) 153/98 97.4  F (36.3  C) Temporal 62 16 99 % 1.905 m (6' 3\") 89.9 kg (198 lb 3.1 oz)       Physical Exam  Constitutional: Vital signs reviewed as above.   Eyes: PEERL, EOMI B/L  Neck: No JVD noted. FROM   Cardiovascular: normal rate, Regular rhythm and normal heart sounds.  No murmur heard. Equal B/L peripheral pulses.  Pulmonary/Chest: Effort normal and breath sounds normal. No respiratory distress. Patient has no wheezes. Patient has no rales.   Gastrointestinal: Soft. There is no tenderness.   Musculoskeletal/Extremities: No pitting edema noted. Normal tone.  Neurological: Alert  Skin: Skin is warm and dry. There is no diaphoresis noted.   Psychiatric: The patient appears calm.     Diagnostics     Laboratory: Imaging:   Labs Ordered and Resulted from Time of ED Arrival to Time of ED Departure   BASIC METABOLIC PANEL - Normal       Result Value    Sodium 143      Potassium 4.6      Chloride 106      Carbon Dioxide (CO2) 25      " Anion Gap 12      Urea Nitrogen 12.3      Creatinine 0.98      GFR Estimate >90      Calcium 9.2      Glucose 98     CBC WITH PLATELETS AND DIFFERENTIAL    WBC Count 4.0      RBC Count 4.63      Hemoglobin 15.3      Hematocrit 43.7      MCV 94      MCH 33.0      MCHC 35.0      RDW 11.9      Platelet Count 183      % Neutrophils 44      % Lymphocytes 45      % Monocytes 8      % Eosinophils 2      % Basophils 1      % Immature Granulocytes 0      NRBCs per 100 WBC 0      Absolute Neutrophils 1.8      Absolute Lymphocytes 1.8      Absolute Monocytes 0.3      Absolute Eosinophils 0.1      Absolute Basophils 0.0      Absolute Immature Granulocytes 0.0      Absolute NRBCs 0.0     MAGNESIUM     No orders to display         EKG   ECG results from 06/24/24   EKG 12-lead, tracing only     Value    Systolic Blood Pressure     Diastolic Blood Pressure     Ventricular Rate 68    Atrial Rate 68    WI Interval 156    QRS Duration 106        QTc 455    P Axis 80    R AXIS 89    T Axis 39    Interpretation ECG      Sinus rhythm  Normal ECG  When compared with ECG of 13-SEP-2022 10:56,  No significant change was found  Interpreted by me at 1621         Independent Interpretation  See ED course    ED Course    Medications Administered  Medications - No data to display    Procedures  Procedures     Discussion of Management  See ED Course    Social Determinants of Health adding to complexity of care  None    ED Course  ED Course as of 06/24/24 1633   Mon Jun 24, 2024   1615 I obtained history and examined the patient as noted above         Medical Decision Making / Diagnosis   CMS Diagnoses: None    Code Status: No Order    MIPS     None    Medical Decision Making:  This 33-year-old presents from urgent care due to an EKG concern.  The patient does not have any symptoms at present.  He had been noticing palpitations for a few weeks and had been referred to get an outpatient EKG today and was supposed to start a Zio patch monitor  today as well.  Based on the EKG read, the patient was sent to the emergency department.  He otherwise feels well.  Laboratory studies are reassuring.  EKG here demonstrates normal sinus rhythm.  I do not see any concerning ischemic changes.  I think he is otherwise safe to be discharged and should begin his wearable heart monitor and follow with primary care in the outpatient setting.    Anticipatory guidance given prior to discharge.      Critical Care:  None.    Disposition:  See ED Course and MDM    ICD-10 Codes:    ICD-10-CM    1. Palpitations  R00.2            Discharge Medications:  New Prescriptions    No medications on file        6/24/2024   Scott Serrano DO     [employer]                    Scott Serrano DO  06/24/24 5939

## 2024-11-30 ENCOUNTER — HEALTH MAINTENANCE LETTER (OUTPATIENT)
Age: 34
End: 2024-11-30